# Patient Record
Sex: FEMALE | Race: WHITE | Employment: OTHER | RURAL
[De-identification: names, ages, dates, MRNs, and addresses within clinical notes are randomized per-mention and may not be internally consistent; named-entity substitution may affect disease eponyms.]

---

## 2024-08-12 ENCOUNTER — HOSPITAL ENCOUNTER (EMERGENCY)
Facility: HOSPITAL | Age: 74
Discharge: HOME | End: 2024-08-12
Attending: FAMILY MEDICINE | Admitting: FAMILY MEDICINE
Payer: MEDICARE

## 2024-08-12 VITALS
BODY MASS INDEX: 22.84 KG/M2 | HEART RATE: 96 BPM | SYSTOLIC BLOOD PRESSURE: 147 MMHG | WEIGHT: 121 LBS | TEMPERATURE: 98.4 F | RESPIRATION RATE: 17 BRPM | DIASTOLIC BLOOD PRESSURE: 104 MMHG | OXYGEN SATURATION: 100 % | HEIGHT: 61 IN

## 2024-08-12 DIAGNOSIS — F31.9 BIPOLAR 1 DISORDER (HCC): Primary | ICD-10-CM

## 2024-08-12 PROCEDURE — 99283 EMERGENCY DEPT VISIT LOW MDM: CPT

## 2024-08-12 ASSESSMENT — LIFESTYLE VARIABLES
HOW MANY STANDARD DRINKS CONTAINING ALCOHOL DO YOU HAVE ON A TYPICAL DAY: PATIENT DOES NOT DRINK
HOW OFTEN DO YOU HAVE A DRINK CONTAINING ALCOHOL: NEVER

## 2024-08-12 ASSESSMENT — PAIN - FUNCTIONAL ASSESSMENT: PAIN_FUNCTIONAL_ASSESSMENT: NONE - DENIES PAIN

## 2024-08-12 NOTE — ED NOTES
[facility] is on the phone and virtual with patient at this time.    Body mass index is 33.29 kg/m².   No acute interventions. Recommend lifestyle changes and outpatient follow up.

## 2024-08-12 NOTE — ED TRIAGE NOTES
Pt arrives EMS with no complaints. Pt reports that she has not taken her prescribed medications for her B[employer] x 3 weeks, stating that she does not need them. Pt denies HI and SI. Pt is very pleasant and cooperative.

## 2024-08-12 NOTE — [facility] NOTE
[facility] assessment completed, and suicide risk level noted to be no risk. Primary Nurse Renata and Physician Dr. Smith notified. Concerns not observed. Patient does not require a 1:1 sitter in the ED as she is no risk for suicide.

## 2024-08-12 NOTE — [facility] NOTE
Comprehensive Assessment Form Part 1      Section I - Disposition    Bipolar disorder, per history    No past medical history on file.    The Medical Doctor to Psychiatrist conference was not completed.  The Medical Doctor is in agreement with Psychiatrist disposition because patient is not seeking inpatient BHU admission.  The plan is to discharge.  The on-call Psychiatrist consulted was N/A.  The admitting Psychiatrist will be N/A.  The admitting Diagnosis is N/A.  The Payor source is not on file.      This writer reviewed the [place] Suicide Severity Rating Scale in nursing flowsheet and the risk level assigned is no risk.  Based on this assessment, the risk of suicide is no risk and the plan is to discharge.    Section II - Integrated Summary  Summary:  Patient arrived to the ED accompanied by her , Alvaro, with complaints of sitting in her car for the last two days listening to music. She was told to come to the ED by EMS/PD for this behavior. This clinician met with Kayla via telehealth to complete the [facility] assessment. She was appropriately dressed and appeared neatly groomed. She was oriented x4 and was calm, cooperative, and pleasant during the assessment. She presented with a euthymic affect and denies any current mental health symptoms. Her thoughts were coherent and logical and she did not display and paranoid or delusional thinking. She denies current SI and has no previous suicide attempts. She denies current HI but states in her \"recent\" past she had thought about running over her neighbor because of a court kim over her dogs barking. She states \"I wrote down on a piece of paper that she needs to die and that was enough. I'm not going to custodial over her.\" Kayla has previous inpatient BHU admissions at [facility] about 10-15 years ago.    Kayla denies a history of mental illness, however, it is reported she has a historical diagnosis of Bipolar disorder. She is prescribed Lithium by \"an unqualified

## 2024-08-12 NOTE — ED NOTES
I have reviewed discharge instructions with the patient and spouse. The patient and spouse verbalized understanding.  No questions at this time. Ambulated without difficulty.

## 2024-08-12 NOTE — ED PROVIDER NOTES
is fine, and does not need any treatment. She was interviewed by the counselor with [facility] and there were no criteria met for admission to hospital. Will discharge home with suggestion to follow up with Dr Lazaro's outpatient clinic or with the  in [place].         Disposition Considerations (Tests not done, Shared Decision Making, Pt Expectation of Test or Tx.): none     FINAL IMPRESSION     1. Bipolar 1 disorder (HCC)          DISPOSITION/PLAN   DISPOSITION Decision To Discharge 08/12/2024 02:38:57 PM      Discharge Note: The patient is stable for discharge home. The signs, symptoms, diagnosis, and discharge instructions have been discussed, understanding conveyed, and agreed upon. The patient is to follow up as recommended or return to ER should their symptoms worsen.      PATIENT REFERRED TO:  No follow-up provider specified.       DISCHARGE MEDICATIONS:     Medication List      You have not been prescribed any medications.           DISCONTINUED MEDICATIONS:  There are no discharge medications for this patient.      I am the Primary Clinician of Record.   Terra Smith MD (electronically signed)      (Please note that parts of this dictation were completed with voice recognition software. Quite often unanticipated grammatical, syntax, homophones, and other interpretive errors are inadvertently transcribed by the computer software. Please disregards these errors. Please excuse any errors that have escaped final proofreading.)          Terra Smith MD  08/13/24 [id number]